# Patient Record
Sex: FEMALE | Race: BLACK OR AFRICAN AMERICAN | NOT HISPANIC OR LATINO | Employment: UNEMPLOYED | URBAN - METROPOLITAN AREA
[De-identification: names, ages, dates, MRNs, and addresses within clinical notes are randomized per-mention and may not be internally consistent; named-entity substitution may affect disease eponyms.]

---

## 2017-04-14 ENCOUNTER — OFFICE VISIT (OUTPATIENT)
Dept: RETAIL CLINIC | Facility: CLINIC | Age: 47
End: 2017-04-14

## 2017-04-14 VITALS — DIASTOLIC BLOOD PRESSURE: 110 MMHG | SYSTOLIC BLOOD PRESSURE: 190 MMHG

## 2017-04-14 DIAGNOSIS — Z76.0 ENCOUNTER FOR MEDICATION REFILL: ICD-10-CM

## 2017-04-14 DIAGNOSIS — B30.8 CHRONIC VIRAL CONJUNCTIVITIS OF BOTH EYES: Primary | ICD-10-CM

## 2017-04-14 PROCEDURE — 99213 OFFICE O/P EST LOW 20 MIN: CPT | Performed by: NURSE PRACTITIONER

## 2017-04-14 RX ORDER — LORATADINE 10 MG/1
10 TABLET ORAL DAILY
Qty: 30 TABLET | Refills: 0 | Status: SHIPPED | OUTPATIENT
Start: 2017-04-14 | End: 2017-05-14

## 2017-04-14 RX ORDER — ATENOLOL 25 MG/1
25 TABLET ORAL DAILY
Qty: 30 TABLET | Refills: 0 | Status: SHIPPED | OUTPATIENT
Start: 2017-04-14 | End: 2017-05-14

## 2017-04-14 RX ORDER — POLYMYXIN B SULFATE AND TRIMETHOPRIM 1; 10000 MG/ML; [USP'U]/ML
1 SOLUTION OPHTHALMIC EVERY 6 HOURS
Qty: 1 EACH | Refills: 0 | Status: SHIPPED | OUTPATIENT
Start: 2017-04-14 | End: 2017-04-19

## 2017-04-14 RX ORDER — AMLODIPINE BESYLATE 10 MG/1
10 TABLET ORAL DAILY
Qty: 30 TABLET | Refills: 0 | Status: SHIPPED | OUTPATIENT
Start: 2017-04-14 | End: 2017-05-14

## 2017-04-14 RX ORDER — HYDROCHLOROTHIAZIDE 50 MG/1
50 TABLET ORAL DAILY
Qty: 30 TABLET | Refills: 0 | Status: SHIPPED | OUTPATIENT
Start: 2017-04-14 | End: 2017-05-14

## 2017-04-14 NOTE — PROGRESS NOTES
Subjective   Gurwinder LEMA is a 46 y.o. female who presents to the clinic at the request of her PCP for medication refills. Per patient, she has an appointment to see her PCP on 04/27/17. She also reports that she has been out of her blood pressure medication for approximately 3 weeks.     Eye Problem    Both eyes are affected.This is a recurrent problem. The current episode started in the past 7 days. The problem occurs constantly. The problem has been gradually worsening. The patient is experiencing no pain. There is known exposure to pink eye. She does not wear contacts. Associated symptoms include eye redness and itching. Pertinent negatives include no eye discharge, double vision, foreign body sensation or photophobia. She has tried eye drops and water for the symptoms. The treatment provided no relief.       The following portions of the patient's history were reviewed and updated as appropriate: allergies, current medications, past family history, past medical history, past social history, past surgical history and problem list.    Review of Systems   Constitutional: Negative.    HENT: Negative.    Eyes: Positive for redness and itching. Negative for double vision, photophobia, discharge and visual disturbance.   Respiratory: Negative.    Gastrointestinal: Negative.    Musculoskeletal: Negative.    Skin: Positive for itching.   Allergic/Immunologic: Positive for environmental allergies.   Neurological: Negative.    Psychiatric/Behavioral: Positive for agitation. The patient is hyperactive.        Objective   Physical Exam   Constitutional: She appears well-developed and well-nourished.   Eyes: EOM and lids are normal. Pupils are equal, round, and reactive to light. Right eye exhibits no discharge and no exudate. Left eye exhibits no discharge and no exudate. Right conjunctiva is injected. Left conjunctiva is injected.   Cardiovascular: Normal rate, regular rhythm and normal heart sounds.    No murmur  heard.  Pulmonary/Chest: Effort normal.   Skin: Skin is warm and dry.   Nursing note and vitals reviewed.     Vitals:    04/14/17 1308   BP: (!) 190/110     Assessment/Plan   Gurwinder was seen today for eye problem.    Diagnoses and all orders for this visit:    Chronic viral conjunctivitis of both eyes  -     loratadine (CLARITIN) 10 MG tablet; Take 1 tablet by mouth Daily for 30 days.  -     trimethoprim-polymyxin b (POLYTRIM) 61294-3.1 UNIT/ML-% ophthalmic solution; Administer 1 drop to both eyes Every 6 (Six) Hours for 5 days.    Encounter for medication refill  -     atenolol (TENORMIN) 25 MG tablet; Take 1 tablet by mouth Daily for 30 days.  -     amLODIPine (NORVASC) 10 MG tablet; Take 1 tablet by mouth Daily for 30 days.  -     hydrochlorothiazide (HYDRODIURIL) 50 MG tablet; Take 1 tablet by mouth Daily for 30 days.   Patient instructed to monitor BP frequently and to reports to the ED if no improvement after taking her blood pressure medications.   Reviewed home care instructions, and patient verbalized understanding. Patient instructed to follow up with PCP and/or ED for worsening or non-resolving symptoms.     MARGE Murry

## 2017-04-14 NOTE — PATIENT INSTRUCTIONS
Allergic Conjunctivitis  Allergic conjunctivitis is inflammation of the clear membrane that covers the white part of your eye and the inner surface of your eyelid (conjunctiva), and it is caused by allergies. The blood vessels in the conjunctiva become inflamed, and this causes the eye to become red or pink, and it often causes itchiness in the eye. Allergic conjunctivitis cannot be spread by one person to another person (noncontagious).  CAUSES  This condition is caused by an allergic reaction. Common causes of an allergic reaction (allergens) include:  · Dust.  · Pollen.  · Mold.  · Animal dander or secretions.  RISK FACTORS  This condition is more likely to develop if you are exposed to high levels of allergens that cause the allergic reaction. This might include being outdoors when air pollen levels are high or being around animals that you are allergic to.  SYMPTOMS  Symptoms of this condition may include:  · Eye redness.  · Tearing of the eyes.  · Watery eyes.  · Itchy eyes.  · Burning feeling in the eyes.  · Clear drainage from the eyes.  · Swollen eyelids.  DIAGNOSIS  This condition may be diagnosed by medical history and physical exam. If you have drainage from your eyes, it may be tested to rule out other causes of conjunctivitis.  TREATMENT  Treatment for this condition often includes medicines. These may be eye drops, ointments, or oral medicines. They may be prescription medicines or over-the-counter medicines.  HOME CARE INSTRUCTIONS  · Take or apply medicines only as directed by your health care provider.  · Do not touch or rub your eyes.  · Do not wear contact lenses until the inflammation is gone. Wear glasses instead.  · Do not wear eye makeup until the inflammation is gone.  · Apply a cool, clean washcloth to your eye for 10-20 minutes, 3-4 times a day.  · Try to avoid whatever allergen is causing the allergic reaction.  SEEK MEDICAL CARE IF:  · Your symptoms get worse.  · You have pus draining  from your eye.  · You have new symptoms.  · You have a fever.     This information is not intended to replace advice given to you by your health care provider. Make sure you discuss any questions you have with your health care provider.     Document Released: 03/09/2004 Document Revised: 01/08/2016 Document Reviewed: 09/29/2015  [employer] Interactive Patient Education ©2016 [employer]

## 2017-04-23 ENCOUNTER — OFFICE VISIT (OUTPATIENT)
Dept: RETAIL CLINIC | Facility: CLINIC | Age: 47
End: 2017-04-23

## 2017-04-23 VITALS
HEIGHT: 63 IN | BODY MASS INDEX: 42.88 KG/M2 | DIASTOLIC BLOOD PRESSURE: 82 MMHG | WEIGHT: 242 LBS | TEMPERATURE: 98.9 F | SYSTOLIC BLOOD PRESSURE: 146 MMHG | RESPIRATION RATE: 18 BRPM | OXYGEN SATURATION: 98 %

## 2017-04-23 DIAGNOSIS — J06.9 UPPER RESPIRATORY INFECTION WITH COUGH AND CONGESTION: Primary | ICD-10-CM

## 2017-04-23 PROCEDURE — 99213 OFFICE O/P EST LOW 20 MIN: CPT | Performed by: NURSE PRACTITIONER

## 2017-04-23 RX ORDER — DEXTROMETHORPHAN HYDROBROMIDE AND PROMETHAZINE HYDROCHLORIDE 15; 6.25 MG/5ML; MG/5ML
5 SYRUP ORAL NIGHTLY PRN
Qty: 35 ML | Refills: 0 | Status: SHIPPED | OUTPATIENT
Start: 2017-04-23 | End: 2017-04-30

## 2017-04-23 RX ORDER — FLUTICASONE PROPIONATE 50 MCG
2 SPRAY, SUSPENSION (ML) NASAL DAILY
Qty: 1 EACH | Refills: 0 | Status: SHIPPED | OUTPATIENT
Start: 2017-04-23 | End: 2017-05-23

## 2017-04-23 NOTE — PROGRESS NOTES
Chelly LEMA is a 46 y.o. female.     URI      URI    This is a new problem. The current episode started in the past 7 days. The problem has been unchanged. There has been no fever. Associated symptoms include congestion, coughing (productive, yellow sputum), rhinorrhea and a sore throat (due to cough). Pertinent negatives include no abdominal pain, chest pain, diarrhea, ear pain, headaches, plugged ear sensation, rash, sinus pain, vomiting or wheezing. She has tried sleep for the symptoms. The treatment provided no relief.        The following portions of the patient's history were reviewed and updated as appropriate: allergies, current medications, past family history, past medical history, past social history, past surgical history and problem list.       Review of Systems   Constitutional: Positive for fatigue. Negative for chills and fever.   HENT: Positive for congestion, postnasal drip, rhinorrhea and sore throat (due to cough). Negative for ear pain and trouble swallowing.    Eyes: Negative.    Respiratory: Positive for cough (productive, yellow sputum). Negative for chest tightness and wheezing.    Cardiovascular: Negative for chest pain and palpitations.   Gastrointestinal: Negative.  Negative for abdominal pain, diarrhea and vomiting.   Musculoskeletal: Negative for myalgias.   Skin: Negative for rash.   Allergic/Immunologic: Positive for environmental allergies.   Neurological: Negative for headaches.       Objective   Physical Exam   Constitutional: She is oriented to person, place, and time. Vital signs are normal. She appears well-developed and well-nourished. She appears ill.   HENT:   Head: Normocephalic and atraumatic.   Right Ear: Hearing and ear canal normal. A middle ear effusion is present.   Left Ear: Hearing and ear canal normal. A middle ear effusion is present.   Nose: Mucosal edema and rhinorrhea present. No sinus tenderness. Right sinus exhibits no maxillary sinus  "tenderness and no frontal sinus tenderness. Left sinus exhibits no maxillary sinus tenderness and no frontal sinus tenderness.   Mouth/Throat: Uvula is midline and mucous membranes are normal. Posterior oropharyngeal erythema present. No oropharyngeal exudate. Tonsils are 0 on the right. Tonsils are 0 on the left. No tonsillar exudate.   Eyes: Conjunctivae are normal.   Neck: Normal range of motion. Neck supple.   Cardiovascular: Normal rate, regular rhythm, S1 normal, S2 normal and normal heart sounds.    Pulmonary/Chest: Effort normal and breath sounds normal. No accessory muscle usage. No respiratory distress.   Lymphadenopathy:     She has cervical adenopathy (shotty).   Neurological: She is alert and oriented to person, place, and time.   Skin: Skin is warm and dry.   Psychiatric: She has a normal mood and affect. Her behavior is normal.   Vitals reviewed.      /82  Temp 98.9 °F (37.2 °C) (Oral)   Resp 18  Ht 63\" (160 cm)  Wt 242 lb (110 kg)  SpO2 98%  BMI 42.87 kg/m2    Assessment/Plan   Gurwinder was seen today for uri.    Diagnoses and all orders for this visit:    Upper respiratory infection with cough and congestion  -     fluticasone (FLONASE) 50 MCG/ACT nasal spray; 2 sprays into each nostril Daily for 30 days.  -     promethazine-dextromethorphan (PROMETHAZINE-DM) 6.25-15 MG/5ML syrup; Take 5 mL by mouth At Night As Needed for Cough for up to 7 days.    Patient verbalized that no allergy medication or pill form medications for cough have worked for her. She declined all medications offered except cough syrup and flonase.    Discussed her establishing care with a PCP - she has an appointment later next month.    Plan of care reviewed with patient at the conclusion of today's visit. Patient education was provided in regards to diagnosis and prescribed medications, including signs of adverse reactions. Encouraged patient to increase fluids, rest, and other comfort measures. Instructed patient to " follow up with PCP, go to Urgent Care Center, or Emergency Room if symptoms worsen. Patient verbalized understanding.     Jw Martinez, APRN

## 2017-04-23 NOTE — PATIENT INSTRUCTIONS
Dextromethorphan; Promethazine oral solution  What is this medicine?  DEXTROMETHORPHAN; PROMETHAZINE (dex troe meth OR fan; proe METH a zeen) is a cough suppressant and an antihistamine. It is used to treat coughing due to colds or allergies. This medicine will not treat an infection.  This medicine may be used for other purposes; ask your health care provider or pharmacist if you have questions.  COMMON BRAND NAME(S): Phen Estela DM  What should I tell my health care provider before I take this medicine?  They need to know if you have any of the following conditions:  -asthma or other lung disease  -diabetes  -eczema  -seizure disorder  -serious or chronic illness  -sleep apnea  -an unusual or allergic reaction to dextromethorphan, promethazine, phenothiazines, other medicines, foods, dyes, or preservatives  -pregnant or trying to get pregnant  -breast-feeding  How should I use this medicine?  Take this medicine by mouth with a glass of water. Follow the directions on the prescription label. Use a specially marked spoon or container to measure your medicine. Household spoons are not accurate. Take your doses at regular intervals. Do not take your medicine more often than directed.  Talk to your pediatrician regarding the use of this medicine in children. Special care may be needed. Do not use this medicine in children less than 2 years of age.  Patients over 65 years old may have a stronger reaction and need a smaller dose.  Overdosage: If you think you have taken too much of this medicine contact a poison control center or emergency room at once.  NOTE: This medicine is only for you. Do not share this medicine with others.  What if I miss a dose?  If you miss a dose, take it as soon as you can. If it is almost time for your next dose, take only that dose. Do not take double or extra doses.  What may interact with this medicine?  Do not take this medicine with any of the following medications:  -MAOIs like Carbex,  Eldepryl, Marplan, Nardil, and Parnate  This medicine may also interact with the following medications:  -alcohol or alcohol-containing products  -barbiturate medicines like phenobarbital  -epinephrine  -medicines for depression, anxiety or psychotic disturbances  -medicines for Parkinson's disease  -medicines for sleep  -medicines for the stomach like metoclopramide, dicyclomine, glycopyrrolate  -pain medicines  -radio contrast dyes  -sibutramine  -some medicines for cold or allergies  This list may not describe all possible interactions. Give your health care provider a list of all the medicines, herbs, non-prescription drugs, or dietary supplements you use. Also tell them if you smoke, drink alcohol, or use illegal drugs. Some items may interact with your medicine.  What should I watch for while using this medicine?  Tell your doctor if your symptoms do not improve or if they get worse.  You may get drowsy or dizzy. Do not drive, use machinery, or do anything that needs mental alertness until you know how this medicine affects you. Do not stand or sit up quickly, especially if you are an older patient. This reduces the risk of dizzy or fainting spells. Alcohol may interfere with the effect of this medicine. Avoid alcoholic drinks.  This medicine can make you more sensitive to the sun. Keep out of the sun. If you cannot avoid being in the sun, wear protective clothing and use sunscreen. Do not use sun lamps or tanning beds/booths.  What side effects may I notice from receiving this medicine?  Side effects that you should report to your doctor or health care professional as soon as possible:  -allergic reactions like skin rash, itching or hives, swelling of the face, lips, or tongue  -breathing problems  -changes in vision  -confused, disoriented, excitable  -fast, irregular heartbeat  -fever, sweating  -hallucinations  -high or low blood pressure  -lightheaded  -muscle stiffness  -seizures  -tremors,  twitches  -yellow eyes or skin  Side effects that usually do not require medical attention (report to your doctor or health care professional if they continue or are bothersome):  -congestion in the nose  -dry mouth  -nausea, vomiting  -stomach upset  -trouble sleeping  This list may not describe all possible side effects. Call your doctor for medical advice about side effects. You may report side effects to FDA at 4-135-CZF-5069.  Where should I keep my medicine?  Keep out of the reach of children.  Store at room temperature between 20 and 25 degrees C (68 and 77 degrees F). Protect from light. Throw away any unused medicine after the expiration date.  NOTE: This sheet is a summary. It may not cover all possible information. If you have questions about this medicine, talk to your doctor, pharmacist, or health care provider.     © 2017, [employer]. (2009-04-06 17:01:49)  Fluticasone nasal spray  What is this medicine?  FLUTICASONE (floo TIK a sone) is a corticosteroid. This medicine is used to treat the symptoms of allergies like sneezing, itchy red eyes, and itchy, runny, or stuffy nose.  This medicine may be used for other purposes; ask your health care provider or pharmacist if you have questions.  COMMON BRAND NAME(S): Flonase, Flonase Allergy Relief, Flonase Sensimist, Veramyst  What should I tell my health care provider before I take this medicine?  They need to know if you have any of these conditions:  -infection, like tuberculosis, herpes, or fungal infection  -recent surgery on nose or sinuses  -taking corticosteroid by mouth  -an unusual or allergic reaction to fluticasone, steroids, other medicines, foods, dyes, or preservatives  -pregnant or trying to get pregnant  -breast-feeding  How should I use this medicine?  This medicine is for use in the nose. Follow the directions on your product or prescription label. This medicine works best if used at regular intervals. Do not use more often than  directed. Make sure that you are using your nasal spray correctly. After 6 months of daily use without a prescription, talk to your doctor or health care professional before using it for a longer time. Ask your doctor or health care professional if you have any questions.  Talk to your pediatrician regarding the use of this medicine in children. Special care may be needed. This medicine has been used in children as young as 2 years. After two months of daily use without a prescription in a child, talk to your pediatrician before using it for a longer time.  Overdosage: If you think you have taken too much of this medicine contact a poison control center or emergency room at once.  NOTE: This medicine is only for you. Do not share this medicine with others.  What if I miss a dose?  If you miss a dose, use it as soon as you remember. If it is almost time for your next dose, use only that dose and continue with your regular schedule. Do not use double or extra doses.  What may interact with this medicine?  -ketoconazole  -metyrapone  -some medicines for HIV  -vaccines  This list may not describe all possible interactions. Give your health care provider a list of all the medicines, herbs, non-prescription drugs, or dietary supplements you use. Also tell them if you smoke, drink alcohol, or use illegal drugs. Some items may interact with your medicine.  What should I watch for while using this medicine?  Visit your doctor or health care professional for regular checks on your progress. Some symptoms may improve within 12 hours after starting use. Check with your doctor or health care professional if there is no improvement in your condition after 3 weeks of use.  Do not come in contact with people who have chickenpox or the measles while you are taking this medicine. If you do, call your doctor right away.  What side effects may I notice from receiving this medicine?  Side effects that you should report to your doctor or  health care professional as soon as possible:  -allergic reactions like skin rash, itching or hives, swelling of the face, lips, or tongue  -changes in vision  -flu-like symptoms  -white patches or sores in the mouth or nose  Side effects that usually do not require medical attention (report to your doctor or health care professional if they continue or are bothersome):  -burning or irritation inside the nose or throat  -cough  -headache  -nosebleed  -unusual taste or smell  This list may not describe all possible side effects. Call your doctor for medical advice about side effects. You may report side effects to FDA at 0-770-EVN-6199.  Where should I keep my medicine?  Keep out of the reach of children.  Store at room temperature between 15 and 30 degrees C (59 and 86 degrees F). Throw away any unused medicine after the expiration date.  NOTE: This sheet is a summary. It may not cover all possible information. If you have questions about this medicine, talk to your doctor, pharmacist, or health care provider.     © 2017, [employer]. (2015-05-13 09:07:53)

## 2017-05-02 ENCOUNTER — OFFICE VISIT (OUTPATIENT)
Dept: OBSTETRICS AND GYNECOLOGY | Facility: CLINIC | Age: 47
End: 2017-05-02

## 2017-05-02 VITALS — WEIGHT: 246.6 LBS | HEIGHT: 63 IN | BODY MASS INDEX: 43.7 KG/M2 | RESPIRATION RATE: 18 BRPM

## 2017-05-02 DIAGNOSIS — N95.1 VASOMOTOR SYMPTOMS DUE TO MENOPAUSE: Primary | ICD-10-CM

## 2017-05-02 DIAGNOSIS — E66.9 OBESITY (BMI 35.0-39.9 WITHOUT COMORBIDITY): ICD-10-CM

## 2017-05-02 DIAGNOSIS — Z71.6 TOBACCO ABUSE COUNSELING: ICD-10-CM

## 2017-05-02 PROCEDURE — 99214 OFFICE O/P EST MOD 30 MIN: CPT | Performed by: OBSTETRICS & GYNECOLOGY

## 2017-05-04 ENCOUNTER — LAB (OUTPATIENT)
Dept: LAB | Facility: HOSPITAL | Age: 47
End: 2017-05-04

## 2017-05-04 DIAGNOSIS — N95.1 VASOMOTOR SYMPTOMS DUE TO MENOPAUSE: ICD-10-CM

## 2017-05-04 LAB
ESTRADIOL SERPL HS-MCNC: 29 PG/ML
FSH SERPL-ACNC: 9.7 MIU/ML
LH SERPL-ACNC: 7.1 MIU/ML
PROGEST SERPL-MCNC: 0.31 NG/ML
T4 FREE SERPL-MCNC: 0.92 NG/DL (ref 0.89–1.76)
TSH SERPL DL<=0.05 MIU/L-ACNC: 0.79 MIU/ML (ref 0.35–5.35)

## 2017-05-04 PROCEDURE — 83002 ASSAY OF GONADOTROPIN (LH): CPT | Performed by: OBSTETRICS & GYNECOLOGY

## 2017-05-04 PROCEDURE — 84439 ASSAY OF FREE THYROXINE: CPT | Performed by: OBSTETRICS & GYNECOLOGY

## 2017-05-04 PROCEDURE — 83001 ASSAY OF GONADOTROPIN (FSH): CPT | Performed by: OBSTETRICS & GYNECOLOGY

## 2017-05-04 PROCEDURE — 84443 ASSAY THYROID STIM HORMONE: CPT | Performed by: OBSTETRICS & GYNECOLOGY

## 2017-05-04 PROCEDURE — 82670 ASSAY OF TOTAL ESTRADIOL: CPT | Performed by: OBSTETRICS & GYNECOLOGY

## 2017-05-04 PROCEDURE — 84144 ASSAY OF PROGESTERONE: CPT | Performed by: OBSTETRICS & GYNECOLOGY

## 2017-05-24 ENCOUNTER — TELEPHONE (OUTPATIENT)
Dept: INTERNAL MEDICINE | Facility: CLINIC | Age: 47
End: 2017-05-24

## 2017-05-24 ENCOUNTER — OFFICE VISIT (OUTPATIENT)
Dept: INTERNAL MEDICINE | Facility: CLINIC | Age: 47
End: 2017-05-24

## 2017-05-24 VITALS
DIASTOLIC BLOOD PRESSURE: 80 MMHG | TEMPERATURE: 98 F | OXYGEN SATURATION: 97 % | HEART RATE: 74 BPM | SYSTOLIC BLOOD PRESSURE: 132 MMHG

## 2017-05-24 DIAGNOSIS — Z76.0 MEDICATION REFILL: ICD-10-CM

## 2017-05-24 DIAGNOSIS — I10 ESSENTIAL HYPERTENSION: ICD-10-CM

## 2017-05-24 DIAGNOSIS — J06.9 UPPER RESPIRATORY TRACT INFECTION, UNSPECIFIED TYPE: ICD-10-CM

## 2017-05-24 DIAGNOSIS — E78.5 HYPERLIPIDEMIA, UNSPECIFIED HYPERLIPIDEMIA TYPE: Primary | ICD-10-CM

## 2017-05-24 PROBLEM — J45.40 MODERATE PERSISTENT ASTHMA WITHOUT COMPLICATION: Status: ACTIVE | Noted: 2017-05-24

## 2017-05-24 PROBLEM — G47.33 OBSTRUCTIVE SLEEP APNEA SYNDROME: Status: ACTIVE | Noted: 2017-05-24

## 2017-05-24 PROCEDURE — 99204 OFFICE O/P NEW MOD 45 MIN: CPT | Performed by: FAMILY MEDICINE

## 2017-05-24 RX ORDER — PROMETHAZINE HYDROCHLORIDE AND PHENYLEPHRINE HYDROCHLORIDE 6.25; 5 MG/5ML; MG/5ML
5 SYRUP ORAL EVERY 4 HOURS PRN
Qty: 118 ML | Refills: 0 | Status: SHIPPED | OUTPATIENT
Start: 2017-05-24

## 2017-05-24 RX ORDER — AMLODIPINE BESYLATE 10 MG/1
10 TABLET ORAL DAILY
Qty: 30 TABLET | Refills: 1 | Status: SHIPPED | OUTPATIENT
Start: 2017-05-24

## 2017-05-24 RX ORDER — AMOXICILLIN 500 MG/1
500 CAPSULE ORAL 3 TIMES DAILY
Qty: 30 CAPSULE | Refills: 0 | Status: SHIPPED | OUTPATIENT
Start: 2017-05-24

## 2017-05-24 RX ORDER — ATENOLOL 25 MG/1
25 TABLET ORAL DAILY
Qty: 30 TABLET | Refills: 1 | Status: SHIPPED | OUTPATIENT
Start: 2017-05-24

## 2017-05-24 RX ORDER — ATORVASTATIN CALCIUM 20 MG/1
20 TABLET, FILM COATED ORAL DAILY
Qty: 30 TABLET | Refills: 4 | Status: SHIPPED | OUTPATIENT
Start: 2017-05-24

## 2017-05-24 RX ORDER — DILTIAZEM HYDROCHLORIDE 240 MG/1
240 CAPSULE, COATED, EXTENDED RELEASE ORAL DAILY
Qty: 30 CAPSULE | Refills: 1 | Status: SHIPPED | OUTPATIENT
Start: 2017-05-24

## 2017-05-24 RX ORDER — DILTIAZEM HYDROCHLORIDE 240 MG/1
240 CAPSULE, COATED, EXTENDED RELEASE ORAL DAILY
COMMUNITY
End: 2017-05-24 | Stop reason: SDUPTHER

## 2017-05-24 RX ORDER — CIPROFLOXACIN 500 MG/1
500 TABLET, FILM COATED ORAL 2 TIMES DAILY
Qty: 20 TABLET | Refills: 0 | Status: SHIPPED | OUTPATIENT
Start: 2017-05-24

## 2017-05-24 RX ORDER — HYDROCHLOROTHIAZIDE 50 MG/1
50 TABLET ORAL DAILY
Qty: 30 TABLET | Refills: 1 | Status: SHIPPED | OUTPATIENT
Start: 2017-05-24

## 2017-05-30 ENCOUNTER — TELEPHONE (OUTPATIENT)
Dept: OBSTETRICS AND GYNECOLOGY | Facility: CLINIC | Age: 47
End: 2017-05-30

## 2017-05-31 RX ORDER — NICOTINE 21 MG/24HR
1 PATCH, TRANSDERMAL 24 HOURS TRANSDERMAL EVERY 24 HOURS
Qty: 28 PATCH | Refills: 3 | Status: SHIPPED | OUTPATIENT
Start: 2017-05-31

## 2021-11-21 ENCOUNTER — HOSPITAL ENCOUNTER (EMERGENCY)
Facility: HOSPITAL | Age: 51
Discharge: HOME | End: 2021-11-21
Attending: EMERGENCY MEDICINE | Admitting: EMERGENCY MEDICINE

## 2021-11-21 VITALS
DIASTOLIC BLOOD PRESSURE: 105 MMHG | RESPIRATION RATE: 20 BRPM | TEMPERATURE: 97.9 F | OXYGEN SATURATION: 98 % | BODY MASS INDEX: 46.95 KG/M2 | HEIGHT: 63 IN | SYSTOLIC BLOOD PRESSURE: 193 MMHG | HEART RATE: 82 BPM | WEIGHT: 265 LBS

## 2021-11-21 DIAGNOSIS — J06.9 UPPER RESPIRATORY TRACT INFECTION, UNSPECIFIED TYPE: ICD-10-CM

## 2021-11-21 DIAGNOSIS — B37.2 INTERTRIGINOUS CANDIDIASIS: Primary | ICD-10-CM

## 2021-11-21 PROCEDURE — 99283 EMERGENCY DEPT VISIT LOW MDM: CPT

## 2021-11-21 RX ORDER — FLUTICASONE PROPIONATE 50 MCG
2 SPRAY, SUSPENSION (ML) NASAL DAILY
Qty: 9.9 G | Refills: 0 | Status: SHIPPED | OUTPATIENT
Start: 2021-11-21 | End: 2021-11-21

## 2021-11-21 RX ORDER — CLOTRIMAZOLE 1 %
1 CREAM (GRAM) TOPICAL 2 TIMES DAILY
Qty: 14 G | Refills: 0 | Status: SHIPPED | OUTPATIENT
Start: 2021-11-21 | End: 2021-11-28

## 2021-11-21 RX ORDER — FLUTICASONE PROPIONATE 50 MCG
2 SPRAY, SUSPENSION (ML) NASAL DAILY
Qty: 9.9 G | Refills: 0 | Status: SHIPPED | OUTPATIENT
Start: 2021-11-21 | End: 2021-11-28

## 2021-11-21 RX ORDER — CLOTRIMAZOLE AND BETAMETHASONE DIPROPIONATE 10; .64 MG/G; MG/G
1 CREAM TOPICAL 2 TIMES DAILY
Qty: 15 G | Refills: 0 | Status: SHIPPED | OUTPATIENT
Start: 2021-11-21 | End: 2021-11-21

## 2021-11-21 RX ADMIN — MICONAZOLE NITRATE 1 APPLICATION: 20 CREAM TOPICAL at 18:26
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
## 2021-11-21 NOTE — DISCHARGE INSTRUCTIONS
Apply Lotrimin cream to the rash 2 times a day and keep the area clean and dry with a dressing.  Continue current medications.  Call your doctor tomorrow morning for follow-up appointment use Flonase as prescribed

## 2021-11-22 NOTE — ED NOTES
Pt refused to sign paperwork b/c she was upset the Provider didn't give her a script for promethazine.  Pt wheeled herself out of the ED in her personal wheelchair with no s/s of distress.       Felicia Do RN  11/21/21 [id number]

## 2021-11-22 NOTE — ED NOTES
Pt wheeled herself out in her personal wheelchair upon discharge from ED.  No s/s of distress.       Felicia Do RN  11/21/21 [id number]